# Patient Record
(demographics unavailable — no encounter records)

---

## 2017-05-09 NOTE — HP
COWS





- Scale


Resting Pulse: 0= [place] 80 or Below


Sweatin=Flushed/Facial Moisture


Restless Observation: 1= Difficult to Sit Still


Pupil Size: 2= Moderately Dilated


Bone or Joint Aches: 2= Severe Diffuse Aches


Runny Nose/ Eye Tearin= Runny Nose/Eyes


GI Upset > 30mins: 2= Nausea/Diarrhea


Tremor Observation: 2= Slight Tremor Visible


Yawning Observation: 1= 1-2x During Session


Anxiety or Irritability: 2=Irritable/Anxious


Goose Flesh Skin: 0=Smooth Skin


COWS Score: 16





Admission ROS S





- HPI


Chief Complaint: 


Withdrawal sx.


Allergies/Adverse Reactions: 


 Allergies











Allergy/AdvReac Type Severity Reaction Status Date / Time


 


No Known Allergies Allergy   Verified 17 10:55











History of Present Illness: 


41 y/o woman with a long hx. of drug dependence is admitted for detox.Pt. 

denies previous detox.


Exam Limitations: No Limitations





- Ebola screening


Have you traveled outside of the country in the last 21 days: No


Have you had contact with anyone from an Ebola affected area: No


Have you been sick,other than usual withdrawal symptoms: No





- Review of Systems


Constitutional: Diaphoresis


EENT: reports: Nose Congestion


Respiratory: reports: No Symptoms reported


Cardiac: reports: No Symptoms Reported


GI: reports: Diarrhea, Nausea, Abdominal cramping


: reports: No Symptoms Reported


Musculoskeletal: reports: Back Pain


Integumentary: reports: Sweating


Neuro: reports: Tremors


Endocrine: reports: No Symptoms Reported


Hematology: reports: No Symptoms Reported


Psychiatric: reports: No Sypmtoms Reported


Other Systems: Reviewed and Negative





Patient History





- Patient Medical History


Hx Anemia: No


Hx Asthma: No


Hx Chronic Obstructive Pulmonary Disease (COPD): No


Hx Cardiac Disorders: No


Hx Hypertension: No


Hx Hypercholesterolemia: No


Hx Pacemaker: No


HX Cerebrovascular Accident: No


Hx Seizures: No


Hx Diabetes: No


Hx Gastrointestinal Disorders: No


Hx Liver Disease: No


Hx Genitourinary Disorders: No


Hx Sexually Transmitted Disorders: No


Hx Renal Disease (ESRD): No


Hx Thyroid Disease: No


Hx Human Immunodeficiency Virus (HIV): No


Hx Hepatitis C: No


Hx Depression: Yes (& anxiety,prescribed Abilify(non-compliant))


Hx Suicide Attempt: No


Hx Bipolar Disorder: No


Hx Schizophrenia: No





- Patient Surgical History


Past Surgical History: No


Hx Neurologic Surgery: No


Hx Cataract Extraction: No


Hx Cardiac Surgery: No


Hx Lung Surgery: No


Hx Breast Surgery: No


Hx Breast Biopsy: No


Hx Abdominal Surgery: No


Hx Appendectomy: No


Hx Cholecystectomy: No


Hx Genitourinary Surgery: No


Hx  Section: No


Hx Orthopedic Surgery: No


Anesthesia Reaction: No





- PPD History


Previous Implant?: Yes


Documented Results: Negative w/o proof


Implanted On Prior [facility] Admission?: No


PPD to be Administered?: Yes





- Reproductive History


Last Menstrual Period: 17


Patient Pregnant: No





- Smoking Cessation


Smoking history: Current every day smoker


Have you smoked in the past 12 months: Yes


Aproximately how many cigarettes per day: 20


Hx Chewing Tobacco Use: No


Initiated information on smoking cessation: Yes


'Breaking Loose' booklet given: 17





- Substance & Tx. History


Hx Alcohol Use: No


Hx Substance Use: Yes


Substance Use Type: Cocaine, Heroin, Marijuana


Hx Substance Use Treatment: No





- Substances Abused


  ** Heroin


Route: Inhalation


Frequency: Daily


Amount used: 8-9 bags


Age of first use: 38


Date of Last Use: 17





  ** Crack


Route: Smoking


Frequency: Daily


Amount used: $300


Age of first use: 14


Date of Last Use: 17





  ** Marijuana


Route: Smoking


Frequency: Daily


Amount used: $10


Age of first use: 14


Date of Last Use: 17





Family Disease History





- Family Disease History


Family Disease History: CA: Mother (Colon)





Admission Physical Exam [facility]





- Vital Signs


Vital Signs: 


 Vital Signs - 24 hr











  17





  10:32


 


Temperature 96.9 F L


 


Pulse Rate 67


 


Respiratory 20





Rate 


 


Blood Pressure 113/77














- Physical


General Appearance: Yes: Tremorous, Irritable, Sweating, Anxious


HEENTM: Yes: Nasal Congestion, Rhinorrhea


Respiratory: Yes: Chest Non-Tender, Lungs Clear, Normal Breath Sounds


Neck: Yes: Supple


Breast: Yes: Breast Exam Deferred


Cardiology: Yes: Regular Rhythm, Regular Rate, S1, S2


Abdominal: Yes: Normal Bowel Sounds, Non Tender, Soft


Genitourinary: Yes: Within Normal Limits


Back: Yes: Within Normal Limits


Musculoskeletal: Yes: Within Normal Limits


Extremities: Yes: Tremors


Neurological: Yes: Fully Oriented, Alert


Integumentary: Yes: Diaphoresis


Lymphatic: Yes: Within Normal Limits





- Diagnostic


(1) Opioid dependence with withdrawal


Current Visit: Yes   Status: Acute





(2) Cocaine dependence, uncomplicated


Current Visit: Yes   Status: Acute





(3) Cannabis dependence, uncomplicated


Current Visit: Yes   Status: Acute








Cleared for Admission [facility]





- Detox or Rehab


[facility] Level of Care: Medically Managed


Detox Regimen/Protocol: Methadone





[facility] Breath Alcohol Content


Breath Alcohol Content: 0





Urine Pregancy Test





- Result


Urine Pregnancy Test Results: Negative- NO Line Present





Urine Drug Screen





- Results


Drug Screen Negative: No


Urine Drug Screen Results: THC-Marijuana, NIDIA-Cocaine, OPI-Opiates

## 2017-05-09 NOTE — EKG
Test Reason : 

Blood Pressure : ***/*** mmHG

Vent. Rate : 065 BPM     Atrial Rate : 065 BPM

   P-R Int : 146 ms          QRS Dur : 096 ms

    QT Int : 442 ms       P-R-T Axes : 032 066 055 degrees

   QTc Int : 459 ms

 

NORMAL SINUS RHYTHM

NORMAL ECG

NO PREVIOUS ECGS AVAILABLE

Confirmed by DUTCH MARVIN MD ([id number]) on 5/9/2017 3:05:28 PM

 

Referred By:             Confirmed By:DUTCH MARVIN MD

## 2017-05-10 NOTE — CONSULT
[facility] Psychiatric Consult





- Data


Date of interview: 05/10/17


Admission source: [facility]


Identifying data: This is 42 years old female with psychiatiorc hospitalization 

history intoxicated with:  Opioids, Cocaine and Cannabis


Substance Abuse History: - Smoking Cessation.  Smoking history: Current every 

day smoker.  Have you smoked in the past 12 months: Yes.  Aproximately how many 

cigarettes per day: 20.  Hx Chewing Tobacco Use: No.  Initiated information on 

smoking cessation: Yes.  'Breaking Loose' booklet given: 05/09/17.  - Substance 

& Tx. History.  Hx Alcohol Use: No.  Hx Substance Use: Yes.  Substance Use Type

: Cocaine, Heroin, Marijuana.  Hx Substance Use Treatment: No.  - Substances 

Abused.  ** Heroin.  Route: Inhalation.  Frequency: Daily.  Amount used: 8-9 

bags.  Age of first use: 38.  Date of Last Use: 05/08/17.  ** Crack.  Route: 

Smoking.  Frequency: Daily.  Amount used: $300.  Age of first use: 14.  Date of 

Last Use: 05/08/17.  ** Marijuana.  Route: Smoking.  Frequency: Daily.  Amount 

used: $10.  Age of first use: 14.  Date of Last Use: 05/08/17


Medical History: Denies significant medical problem


Psychiatric History: Patient reports historyof anxiety and insomnia, reports 

unclear past psychiatric hospitalization history, reports taking priorto 

admission:  'Ambien 10mg po qhs.  [id number] po prn q4 for anxiety and 

agitation


Physical/Sexual Abuse/Trauma History: Denies


Additional Comment: 'Ambien 10mg po qhs.  [id number] po prn q4 for anxiety 

and agitation





Mental Status Exam





- Mental Status Exam


Alert and Oriented to: Person


Cognitive Function: Fair


Patient Appearance: Unkempt


Mood: Sad


Affect: Flat


Patient Behavior: Sedated, Talkative


Speech Pattern: Delayed


Voice Loudness: Normal


Thought Process: Circumstantial


Thought Disorder: Being Controlled


Hallucinations: Denies


Suicidal Ideation: Denies


Homicidal Ideation: Denies


Insight/Judgement: Fair


Sleep: Difficulty falling asleep


Appetite: Fair


Muscle strength/Tone: Mild Hypotonicity


Gait/Station: Shuffling


Additional Comments: 'Ambien 10mg po qhs.  [id number] po prn q4 for anxiety 

and agitation





Psychiatric Findings





- Problem List (Axis 1, 2,3)


(1) Cannabis dependence, uncomplicated


Current Visit: Yes   Status: Acute





(2) Cocaine dependence, uncomplicated


Current Visit: Yes   Status: Acute





(3) Opioid dependence with withdrawal


Current Visit: Yes   Status: Acute





(4) Drug-induced mood disorder


Current Visit: Yes   Status: Acute








- Initial Treatment Plan


Initial Treatment Plan: 'Ambien 10mg po qhs.  [id number] po prn q4 for 

anxiety and agitation

## 2017-05-11 NOTE — PN
[facility] COWS





- Scale


Resting Pulse: 0= [place] 80 or Below


Sweatin=Flushed/Facial Moisture


Restless Observation: 1= Difficult to Sit Still


Pupil Size: 0= Normal to Room Light


Bone or Joint Aches: 2= Severe Diffuse Aches


Runny Nose/ Eye Tearin= Nasal Congestion


GI Upset > 30mins: 0= None


Tremor Observation of Outstretched Hands: 2= Slight Tremor Visible


Yawning Observation: 1= 1-2x During Session


Anxiety or Irritability: 2=Irritable/Anxious


Goose Flesh Skin: 3=Piloerection


COWS Score: 14





[facility] Progress Note (SOAP)


Subjective: 


body aches


shakes


sweats hot/cold


interrupted sleep





Objective: 





17 11:24


 Vital Signs











Temperature  98.2 F   17 10:40


 


Pulse Rate  61   17 10:40


 


Respiratory Rate  16   17 10:40


 


Blood Pressure  124/79   17 10:40


 


O2 Sat by Pulse Oximetry (%)      








 


 Laboratory Tests











  05/09/17 05/09/17 05/10/17





  11:00 15:00 06:00


 


WBC    7.4


 


RBC    4.76


 


Hgb    14.6


 


Hct    43.9


 


MCV    92.3


 


MCHC    33.2


 


RDW    13.6


 


Plt Count    291


 


MPV    10.1


 


Sickle Cell Screen    Negative


 


Sodium   


 


Potassium   


 


Chloride   


 


Carbon Dioxide   


 


Anion Gap   


 


BUN   


 


Creatinine   


 


Creat Clearance w eGFR   


 


Random Glucose   


 


Calcium   


 


Total Bilirubin   


 


AST   


 


ALT   


 


Alkaline Phosphatase   


 


Total Protein   


 


Albumin   


 


Urine Color   Dkyellow 


 


Urine Appearance   Cloudy 


 


Urine pH   6.0 


 


Ur Specific Gravity   1.025 


 


Urine Protein   1+ H 


 


Urine Glucose (UA)   Negative 


 


Urine Ketones   Negative 


 


Urine Blood   Negative 


 


Urine Nitrite   Negative 


 


Urine Bilirubin   Negative 


 


Urine Urobilinogen   2.0 e.u/dl H 


 


Ur Leukocyte Esterase   Trace H 


 


Urine RBC   19 


 


Urine WBC   20 


 


Ur Epithelial Cells   Rare 


 


Urine Bacteria   Rare 


 


Urine Mucus   Many 


 


RPR Titer   


 


HIV 1&2 Antibody Screen  Negative  


 


HIV P24 Antigen  Negative  














  05/10/17 05/10/17





  06:00 06:00


 


WBC  


 


RBC  


 


Hgb  


 


Hct  


 


MCV  


 


MCHC  


 


RDW  


 


Plt Count  


 


MPV  


 


Sickle Cell Screen  


 


Sodium  143 


 


Potassium  3.6 


 


Chloride  105 


 


Carbon Dioxide  25 


 


Anion Gap  13 


 


BUN  8 


 


Creatinine  0.8 


 


Creat Clearance w eGFR  > 60 


 


Random Glucose  130 H 


 


Calcium  8.7 


 


Total Bilirubin  0.3 


 


AST  19 


 


ALT  24 


 


Alkaline Phosphatase  82 


 


Total Protein  6.9 


 


Albumin  3.7 


 


Urine Color  


 


Urine Appearance  


 


Urine pH  


 


Ur Specific Gravity  


 


Urine Protein  


 


Urine Glucose (UA)  


 


Urine Ketones  


 


Urine Blood  


 


Urine Nitrite  


 


Urine Bilirubin  


 


Urine Urobilinogen  


 


Ur Leukocyte Esterase  


 


Urine RBC  


 


Urine WBC  


 


Ur Epithelial Cells  


 


Urine Bacteria  


 


Urine Mucus  


 


RPR Titer   Nonreactive


 


HIV 1&2 Antibody Screen  


 


HIV P24 Antigen  








awake/alert


ambulating


no acute distress


Assessment: 





17 11:25


withdrawal sx


Plan: 


continue detox


increase fluids


psych reordered for medication she takes

## 2017-05-12 NOTE — PN
[facility] Progress Note (SOAP)


Subjective: 


chills


hot/cold sweats


interrupted sleep


body aches


Objective: 





05/12/17 11:01


 Vital Signs











Temperature  96.8 F L  05/12/17 06:00


 


Pulse Rate  56 L  05/12/17 06:00


 


Respiratory Rate  18   05/12/17 06:00


 


Blood Pressure  112/55   05/12/17 06:00


 


O2 Sat by Pulse Oximetry (%)      








awake/alert


ambulating


no acute distress


Assessment: 





05/12/17 11:02


withdrawal sx


Plan: 


continue detox


increase fluids

## 2017-05-13 NOTE — PN
[facility] Progress Note (SOAP)


Subjective: 


ALERT,IRRITABLE,ANXIOUS,INTERRUPTED SLEEP


Objective: 





05/13/17 15:54


 Vital Signs











Temperature  97.9 F   05/13/17 14:46


 


Pulse Rate  88   05/13/17 14:46


 


Respiratory Rate  16   05/13/17 14:46


 


Blood Pressure  112/66   05/13/17 14:46


 


O2 Sat by Pulse Oximetry (%)      











Assessment: 





05/13/17 15:54


WITHDRAWAL SYMPTOM


Plan: 


CONTINUE DETOX,DISCHARGE IN AM

## 2017-05-14 NOTE — DS
[facility] Detox Discharge Summary


Admission Date: 


05/09/17





Discharge Date: 05/14/17





- History


Present History: Cannabis Dependence, Cocaine Dependence, Opioid Dependence


Additional Comments: 


FOLLOW UP WITH AFTER CARE PROGRAM AS ARRANGEMENT





- Physical Exam Results


Vital Signs: 


 Vital Signs











Temperature  97.6 F   05/14/17 07:16


 


Pulse Rate  57 L  05/14/17 07:16


 


Respiratory Rate  16   05/14/17 07:16


 


Blood Pressure  95/48   05/14/17 07:16


 


O2 Sat by Pulse Oximetry (%)      











Pertinent Admission Physical Exam Findings: 


WITHDRAWAL SYMPTOM





- Treatment


Hospital Course: Detox Protocol Followed, Detoxed Safely, Responded well, 

Discharged Condition Good


Patient has Accepted a Rehab Referral to: [facility]





- Medication


Discharge Medications: 


Ambulatory Orders





Mirtazapine [Remeron -] 30 mg PO HS #30 tablet 05/11/17 


Quetiapine Fumarate [Seroquel] 100 mg PO BID #60 tablet 05/11/17 


Quetiapine Fumarate [Seroquel] 100 mg PO BID #60 tablet 05/11/17 











- Diagnosis


(1) Cannabis dependence, uncomplicated


Current Visit: Yes   Status: Acute





(2) Cocaine dependence, uncomplicated


Current Visit: Yes   Status: Acute





(3) Drug-induced mood disorder


Current Visit: Yes   Status: Acute





(4) Opioid dependence with withdrawal


Current Visit: Yes   Status: Acute








- AMA


Did Patient Leave Against Medical Advice: No

## 2017-05-14 NOTE — PN
[facility] Progress Note (SOAP)


Subjective: 


ALERT,NO COMPLAINT


Objective: 





05/14/17 09:06


 Vital Signs











Temperature  97.6 F   05/14/17 07:16


 


Pulse Rate  57 L  05/14/17 07:16


 


Respiratory Rate  16   05/14/17 07:16


 


Blood Pressure  95/48   05/14/17 07:16


 


O2 Sat by Pulse Oximetry (%)      











Assessment: 





05/14/17 09:06


DETOX COMPLETED,NO WITHDRAWAL SYMPTOM


Plan: 


DISCHARGE TODAY,FOLLOW UP WITH AFTER CARE PROGRAM AS ARRANGEMENT